# Patient Record
Sex: MALE | Race: WHITE | HISPANIC OR LATINO | Employment: FULL TIME | URBAN - METROPOLITAN AREA
[De-identification: names, ages, dates, MRNs, and addresses within clinical notes are randomized per-mention and may not be internally consistent; named-entity substitution may affect disease eponyms.]

---

## 2017-02-01 ENCOUNTER — ALLSCRIPTS OFFICE VISIT (OUTPATIENT)
Dept: OTHER | Facility: OTHER | Age: 27
End: 2017-02-01

## 2017-09-07 ENCOUNTER — GENERIC CONVERSION - ENCOUNTER (OUTPATIENT)
Dept: OTHER | Facility: OTHER | Age: 27
End: 2017-09-07

## 2017-09-07 DIAGNOSIS — S56.919A STRAIN OF UNSPECIFIED MUSCLES, FASCIA AND TENDONS AT FOREARM LEVEL, UNSPECIFIED ARM, INITIAL ENCOUNTER: ICD-10-CM

## 2018-01-10 NOTE — MISCELLANEOUS
Message  Return to work or school:   Omi Moran is under my professional care  He was seen in my office on 1/19/16   He is able to return to work on  1/20/16      Dr Kim Xaio          Signatures   Electronically signed by : ZECHARIAH Gonzalez ; Jan 19 2016 11:10AM EST                       (Author)

## 2018-01-11 NOTE — PROGRESS NOTES
Assessment    1  Low back pain (724 2) (M54 5)   2  Encounter for smoking cessation counseling (V65 42,305 1) (Z71 6,Z72 0)   3  Knee pain, bilateral (719 46) (M25 561,M25 562)    Plan  Knee pain, bilateral    · * XR KNEE 3 VW LEFT NON INJURY; Status:Active; Requested FJS:[id number];    · * XR KNEE 3 VW RIGHT NON INJURY; Status:Active; Requested for:24Oct2016;   Low back pain    · * XR SPINE LUMBAR MINIMUM 4 VIEWS NON INJURY; Status:Active; Requested  ESPINOZA:[id number];   Need for influenza vaccination    · Fluzone Quadrivalent Intramuscular Suspension    Discussion/Summary  Impression: health maintenance visit  Currently, he eats a healthy diet  The risks and benefits of immunizations were discussed and immunizations are needed  He was advised to be evaluated by an optometrist and a dentist  Advice and education were given regarding nutrition, weight bearing exercise and tobacco cessation  Patient discussion: discussed with the patient  26 yo male with:  1) Encounter for smoking cessation: I had lengthy discussion with patient today about his smoking hx  We discussed all the options for help with smoking cessation including gums, patches and medications  Patient not interested in pursuing any of these options at this time  He will think it over and we will discuss further at next visit  2) Low back/knee pain: 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  Physical exam shows hypertonic paraspinal muscles  No red flags  I suggested NSAID, stretching, and offered a PT referral which patient declined this time  He is interested in possible steroid injections in his knee for pain/orthopedic referral  I advised patient on baseline xrays prior to any intervention  Rx given for b/l knee xray and lumbar spine xray  Supportive care for now  Follow up in 1 month  3)HM: Flu shot given today, tolerated well  The patient was counseled regarding instructions for management        Chief Complaint  26 yo male here for afsaneh maintenance exam      History of Present Illness  HM, Adult Male: The patient is being seen for a health maintenance evaluation  General Health: The patient's health since the last visit is described as good  He does not have regular dental visits  Immunizations status: not up to date  Lifestyle:  He consumes a diverse and healthy diet  He exercises regularly  He uses tobacco  He denies alcohol use  He denies drug use  Reproductive health:  the patient is sexually active  Screening:   HPI: 26 yo male here for routine health maintenance exam   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  Current every day smoker  1/2 pack per day for 15 years  Offers no other complaints  Review of Systems    Constitutional: no fever and no chills  Eyes: no eyesight problems  ENT: no nasal discharge  Cardiovascular: no chest pain and no palpitations  Respiratory: no shortness of breath and no cough  Gastrointestinal: no abdominal pain, no nausea and no diarrhea  Genitourinary: no dysuria  Musculoskeletal: no arthralgias  Integumentary: no rashes  Neurological: no headache and no numbness  Endocrine: no muscle weakness  ROS reviewed  Active Problems    1  Acromioclavicular joint separation, type 4 (831 04) (S43 109A)   2  Acute bronchitis (466 0) (J20 9)   3  Back muscle spasm (724 8) (M62 830)   4  Bug bite (919 4,E906 4) [facility])    Surgical History    · History of Ankle Surgery    Family History  Mother    · No pertinent family history  Father    · No pertinent family history    Social History    · Always uses seat belt   · Current every day smoker (305 1) (F17 200)   · No alcohol use   · No drug use    Current Meds   1  No Reported Medications  Requested for: 24Oct2016 Recorded    Allergies    1  No Known Drug Allergies    2  No Known Environmental Allergies   3   No Known Food Allergies    Vitals   Recorded: [id number] 92:02EW   Systolic 379   Diastolic 74   Heart Rate 92   Respiration 16   Temperature 97 5 F   Pain Scale 0   Height 6 ft 4 in   Weight 179 lb 8 oz   BMI Calculated 21 85   BSA Calculated 2 12     Physical Exam    Constitutional   General appearance: No acute distress, well appearing and well nourished  Head and Face   Head and face: Normal     Palpation of the face and sinuses: No sinus tenderness  Eyes   Conjunctiva and lids: No erythema, swelling or discharge  Pupils and irises: Equal, round, reactive to light  Ophthalmoscopic examination: Normal fundi and optic discs  Ears, Nose, Mouth, and Throat   External inspection of ears and nose: Normal     Otoscopic examination: Tympanic membranes translucent with normal light reflex  Canals patent without erythema  Nasal mucosa, septum, and turbinates: Normal without edema or erythema  Lips, teeth, and gums: Abnormal   Examination of the teeth revealed poor dentition  Oropharynx: Normal with no erythema, edema, exudate or lesions  Pulmonary   Respiratory effort: No increased work of breathing or signs of respiratory distress  Auscultation of lungs: Clear to auscultation  Cardiovascular   Auscultation of heart: Normal rate and rhythm, normal S1 and S2, no murmurs  Examination of extremities for edema and/or varicosities: Normal     Abdomen   Abdomen: Non-tender, no masses  Musculoskeletal   Gait and station: Normal     Inspection/palpation of digits and nails: Normal without clubbing or cyanosis  Inspection/palpation of joints, bones, and muscles: Abnormal   hypertonic paraspinal muscle lumbar spine  Range of motion: Normal     Stability: Normal     Muscle strength/tone: Normal     Neurologic   Cranial nerves: Cranial nerves 2-12 intact  Reflexes: 2+ and symmetric  Sensation: No sensory loss      Psychiatric   Orientation to person, place and time: Normal     Mood and affect: Normal  Attending Note  Attending Note: Attending Note: I discussed the case with the Resident and reviewed the Resident's note, I supervised the Resident and I agree with the Resident management plan as it was presented to me  Level of Participation: I was present in clinic, but did not examine the patient  Comments/Additional Findings: smoking cessation, precontemplative  I agree with the Resident's note        Future Appointments    Date/Time Provider Specialty Site   11/21/2016 02:40 PM Marie Tenorio DO Family Medicine [address]     Signatures   Electronically signed by : Jose M Ponce DO; Oct 24 2016  9:24PM EST                       (Author)    Electronically signed by : ZECHARIAH Lyn ; Oct 25 2016  8:05PM EST                       (Author)

## 2018-01-11 NOTE — PROGRESS NOTES
Assessment    1  Acute bronchitis (466 0) (J20 9)    Plan  Acute bronchitis    · Azithromycin 250 MG Oral Tablet; TAKE 2 TABLETS ON DAY 1 THEN TAKE 1  TABLET A DAY FOR 4 DAYS    Discussion/Summary    1  Acute Bronchitis: Try supportive care first; if no improvement in 24-48 hours, start azithromycin 250 mg, 2 tabs day 1 and 1 tab days 2-5  Return parameters d/w pt  Smoking cessation d/w pt  F/U to establish care in 4-6 weeks  Possible side effects of new medications were reviewed with the patient/guardian today  The treatment plan was reviewed with the patient/guardian  The patient/guardian understands and agrees with the treatment plan      Chief Complaint  cough      History of Present Illness  HPI: 31 yo male with h/o productive cough with occasional blood-tinged mucus  No F/C/N/V  No wheezing/SOB  No weight loss  No TB exposures  + smoker  No OTC medications  No asthma/AR  + flu shot  Review of Systems    Constitutional: as noted in HPI    ENT: as noted in HPI  Cardiovascular: no complaints of slow or fast heart rate, no chest pain, no palpitations, no leg claudication or lower extremity edema  Respiratory: as noted in HPI  Past Medical History  Active Problems And Past Medical History Reviewed: The active problems and past medical history were reviewed and updated today  Family History  Family History Reviewed: The family history was reviewed and updated today  Social History  The social history was reviewed and updated today  The social history was reviewed and is unchanged  Surgical History  Surgical History Reviewed: The surgical history was reviewed and updated today  Current Meds    The medication list was reviewed and updated today        Vitals   Recorded: [id number] 10:34AM   Temperature 97 3 F   Heart Rate 72   Respiration 16   Systolic 140   Diastolic 70   BP Cuff Size Large   Height 6 ft 5 7 in   Weight 194 lb 6 4 oz   BMI Calculated 22 64   BSA Calculated 2 22   Pain Scale 0     Physical Exam    Constitutional   General appearance: No acute distress, well appearing and well nourished  Ears, Nose, Mouth, and Throat   Otoscopic examination: Tympanic membrance translucent with normal light reflex  Canals patent without erythema  Nasal mucosa, septum, and turbinates: Normal without edema or erythema  Oropharynx: Abnormal   mmm, erythematous  Pulmonary   Respiratory effort: No increased work of breathing or signs of respiratory distress  Auscultation of lungs: Abnormal   coarse b/l breath sounds  Cardiovascular   Auscultation of heart: Normal rate and rhythm, normal S1 and S2, without murmurs           Signatures   Electronically signed by : ZECHARIAH Baugh ; Jan 19 2016 10:57AM EST                       (Author)

## 2018-01-12 NOTE — MISCELLANEOUS
Provider Comments  Provider Comments:   pt was a no show to appt today      Signatures   Electronically signed by : Patric Magdaleno, ; Nov 21 2016  2:57PM EST                       (Author)

## 2018-01-14 VITALS
BODY MASS INDEX: 22.91 KG/M2 | WEIGHT: 188.13 LBS | HEIGHT: 76 IN | TEMPERATURE: 98.3 F | SYSTOLIC BLOOD PRESSURE: 130 MMHG | RESPIRATION RATE: 16 BRPM | DIASTOLIC BLOOD PRESSURE: 80 MMHG | HEART RATE: 92 BPM

## 2018-01-22 VITALS
WEIGHT: 182.5 LBS | DIASTOLIC BLOOD PRESSURE: 70 MMHG | TEMPERATURE: 97.5 F | BODY MASS INDEX: 21.12 KG/M2 | RESPIRATION RATE: 14 BRPM | HEART RATE: 78 BPM | HEIGHT: 78 IN | SYSTOLIC BLOOD PRESSURE: 124 MMHG